# Patient Record
Sex: MALE | Race: WHITE | Employment: STUDENT | URBAN - METROPOLITAN AREA
[De-identification: names, ages, dates, MRNs, and addresses within clinical notes are randomized per-mention and may not be internally consistent; named-entity substitution may affect disease eponyms.]

---

## 2017-01-08 ENCOUNTER — HOSPITAL ENCOUNTER (OUTPATIENT)
Age: 22
Discharge: ANOTHER HOSPITAL | End: 2017-01-08
Attending: FAMILY MEDICINE
Payer: COMMERCIAL

## 2017-01-08 ENCOUNTER — HOSPITAL ENCOUNTER (EMERGENCY)
Facility: HOSPITAL | Age: 22
Discharge: STILL ADMITTED OR ADMINISTRATIVE | End: 2017-01-09
Payer: COMMERCIAL

## 2017-01-08 ENCOUNTER — HOSPITAL (OUTPATIENT)
Dept: OTHER | Age: 22
End: 2017-01-08
Attending: SURGERY

## 2017-01-08 VITALS
TEMPERATURE: 98 F | OXYGEN SATURATION: 100 % | DIASTOLIC BLOOD PRESSURE: 84 MMHG | WEIGHT: 235 LBS | RESPIRATION RATE: 16 BRPM | SYSTOLIC BLOOD PRESSURE: 119 MMHG | HEART RATE: 85 BPM

## 2017-01-08 DIAGNOSIS — R10.31 ACUTE RIGHT LOWER QUADRANT PAIN: Primary | ICD-10-CM

## 2017-01-08 LAB
ALBUMIN SERPL-MCNC: 4.4 GM/DL (ref 3.6–5.1)
ALP SERPL-CCNC: 78 UNIT/L (ref 45–117)
ALT SERPL-CCNC: 28 UNIT/L
AMORPH SED URNS QL MICRO: NORMAL
ANALYZER ANC (IANC): NORMAL
ANION GAP SERPL CALC-SCNC: 10 MMOL/L (ref 10–20)
APPEARANCE UR: CLEAR
AST SERPL-CCNC: 19 UNIT/L
BACTERIA #/AREA URNS HPF: NORMAL /HPF
BASOPHILS # BLD: 0 THOUSAND/MCL (ref 0–0.3)
BASOPHILS NFR BLD: 0 %
BILIRUB CONJ SERPL-MCNC: 0.1 MG/DL (ref 0–0.2)
BILIRUB SERPL-MCNC: 0.6 MG/DL (ref 0.2–1)
BILIRUB UR QL: NEGATIVE
BUN SERPL-MCNC: 12 MG/DL (ref 10–20)
BUN/CREAT SERPL: 12 (ref 7–25)
CALCIUM SERPL-MCNC: 9.4 MG/DL (ref 8.4–10.2)
CAOX CRY URNS QL MICRO: NORMAL
CHLORIDE: 105 MMOL/L (ref 98–107)
CO2 SERPL-SCNC: 31 MMOL/L (ref 21–32)
COLOR UR: YELLOW
CREAT SERPL-MCNC: 0.98 MG/DL (ref 0.67–1.17)
DIFFERENTIAL METHOD BLD: NORMAL
EOSINOPHIL # BLD: 0.2 THOUSAND/MCL (ref 0.1–0.5)
EOSINOPHIL NFR BLD: 3 %
EPITH CASTS #/AREA URNS LPF: NORMAL /[LPF]
ERYTHROCYTE [DISTWIDTH] IN BLOOD: 12 % (ref 11–15)
FATTY CASTS #/AREA URNS LPF: NORMAL /[LPF]
GLUCOSE SERPL-MCNC: 87 MG/DL (ref 65–99)
GLUCOSE UR-MCNC: NEGATIVE MG/DL
GRAN CASTS #/AREA URNS LPF: NORMAL /[LPF]
HEMATOCRIT: 43.4 % (ref 39–51)
HGB BLD-MCNC: 15.1 GM/DL (ref 13–17)
HGB UR QL: NEGATIVE
HYALINE CASTS #/AREA URNS LPF: NORMAL /LPF (ref 0–5)
KETONES UR-MCNC: NEGATIVE MG/DL
LEUKOCYTE ESTERASE UR QL STRIP: NEGATIVE
LIPASE SERPL-CCNC: 98 UNIT/L (ref 73–393)
LYMPHOCYTES # BLD: 1.8 THOUSAND/MCL (ref 1.2–5.2)
LYMPHOCYTES NFR BLD: 21 %
MCH RBC QN AUTO: 30.7 PG (ref 26–34)
MCHC RBC AUTO-ENTMCNC: 34.8 GM/DL (ref 32–36.5)
MCV RBC AUTO: 88.2 FL (ref 78–100)
MIXED CELL CASTS #/AREA URNS LPF: NORMAL /[LPF]
MONOCYTES # BLD: 0.6 THOUSAND/MCL (ref 0.3–0.9)
MONOCYTES NFR BLD: 6 %
MUCOUS THREADS URNS QL MICRO: PRESENT
NEUTROPHILS # BLD: 6.1 THOUSAND/MCL (ref 1.8–8)
NEUTROPHILS NFR BLD: 70 %
NEUTS SEG NFR BLD: NORMAL %
NITRITE UR QL: NEGATIVE
PERCENT NRBC: NORMAL
PH UR: 6 UNIT (ref 5–7)
PLATELET # BLD: 228 THOUSAND/MCL (ref 140–450)
POCT BILIRUBIN URINE: NEGATIVE
POCT BLOOD URINE: NEGATIVE
POCT GLUCOSE URINE: NEGATIVE MG/DL
POCT KETONE URINE: NEGATIVE MG/DL
POCT LEUKOCYTE ESTERASE URINE: NEGATIVE
POCT NITRITE URINE: NEGATIVE
POCT PH URINE: 6.5 (ref 5–8)
POCT PROTEIN URINE: NEGATIVE MG/DL
POCT SPECIFIC GRAVITY URINE: 1.02
POCT URINE CLARITY: CLEAR
POCT URINE COLOR: YELLOW
POCT UROBILINOGEN URINE: 0.2 MG/DL
POTASSIUM SERPL-SCNC: 4.2 MMOL/L (ref 3.4–5.1)
PROT SERPL-MCNC: 7.8 GM/DL (ref 6.4–8.2)
PROT UR QL: NEGATIVE MG/DL
RBC # BLD: 4.92 MILLION/MCL (ref 4.5–5.9)
RBC #/AREA URNS HPF: NORMAL /HPF (ref 0–3)
RBC CASTS #/AREA URNS LPF: NORMAL /[LPF]
RENAL EPI CELLS #/AREA URNS HPF: NORMAL /[HPF]
SODIUM SERPL-SCNC: 142 MMOL/L (ref 135–145)
SP GR UR: 1.02 (ref 1–1.03)
SPECIMEN SOURCE: NORMAL
SPERM URNS QL MICRO: NORMAL
SQUAMOUS #/AREA URNS HPF: NORMAL /HPF (ref 0–5)
T VAGINALIS URNS QL MICRO: NORMAL
TRI-PHOS CRY URNS QL MICRO: NORMAL
URATE CRY URNS QL MICRO: NORMAL
URINE REFLEX: NORMAL
URNS CMNT MICRO: NORMAL
UROBILINOGEN UR QL: 0.2 MG/DL (ref 0–1)
WAXY CASTS #/AREA URNS LPF: NORMAL /[LPF]
WBC # BLD: 8.6 THOUSAND/MCL (ref 4.2–11)
WBC #/AREA URNS HPF: NORMAL /HPF (ref 0–5)
WBC CASTS #/AREA URNS LPF: NORMAL /[LPF]
YEAST HYPHAE URNS QL MICRO: NORMAL
YEAST URNS QL MICRO: NORMAL

## 2017-01-08 PROCEDURE — 81002 URINALYSIS NONAUTO W/O SCOPE: CPT

## 2017-01-08 PROCEDURE — 81002 URINALYSIS NONAUTO W/O SCOPE: CPT | Performed by: FAMILY MEDICINE

## 2017-01-08 PROCEDURE — 99205 OFFICE O/P NEW HI 60 MIN: CPT

## 2017-01-08 NOTE — ED INITIAL ASSESSMENT (HPI)
Patient states he woke up at 5am and had a sharp pain to the right lower abdomen. Patient states it feels like a knife was in his side.

## 2017-01-08 NOTE — ED PROVIDER NOTES
Patient Seen in: [facility] END    History   Patient presents with:  Abdominal Pain    Stated Complaint: RT SIDE PAIN    HPI  23 yo M here with acute onset R sided lower abdominal pain  No prior such pain and no prior surgeries   No Labs Reviewed   POCT URINALYSIS DIPSTICK - Normal       Orders Placed This Encounter  POCT urinalysis dipstick Once    Concern for acute appendicitis discussed   Plan of care as below. He has his uncle here who will drive him to the ER.  Stable vitals a

## 2019-02-26 ENCOUNTER — TELEPHONE (OUTPATIENT)
Dept: PSYCHIATRY | Facility: CLINIC | Age: 24
End: 2019-02-26

## 2019-02-26 NOTE — TELEPHONE ENCOUNTER
Behavorial Health Outpatient Intake Questions    Referred by: Raegan Mendiola with provider before scheduling    Are there any developmental disabilities? No    Does the patient have hearing impairment? No    Does the patient have ICM or CTT? No    Taking injectable psychiatric medications? NoIf yes, patient can not be seen here  Has the patient ever seen or currently see a psychiatrist? No If yes who/when? Has the patient ever seen or currently see a therapist? No If yes who/when? How many visits did the pt have for previous psychiatric treatment?  History    Has the patient served in the [place] [address]? No    If yes, have you had combat services? No    Was the patient activated into federal active duty as a member of the national guard or reserve? No    Minor Child    Who has custody of the child? Is there a custody agreement? If there is a custody agreement remind parent that they must bring a copy to the first appt or they will not be seen  Behavorial Health Outpatient Intake History     Presenting Problem (in patient's words)  ADHD,ADD    Substance Abuse:No concerns of substance abuse are reported  Has the patient been seen here previously, either inpatient or outpatient? No outpatient    If seen as outpatient, what provider(s) did the patient see? A member of the patient's family has been in therapy here with NO    ACCEPTED as a patient Yes Appointment Date: 3/4/19 @ 11: 00am   Shanel Vo    Referred Elsewhere?  No    Primary Care Physician: Nidia Orozco DO    PCP telephone number: [phone]    SUB: Sheryl Meyers JR ,father    : [birth year]  EMPLOYER: [employer]ra Boyle   INS: Bobby Goodwin  ID: [id number]    GRP: [id number]   (coverage starts )  TEL: [phone]

## 2019-03-04 ENCOUNTER — OFFICE VISIT (OUTPATIENT)
Dept: PSYCHIATRY | Facility: CLINIC | Age: 24
End: 2019-03-04
Payer: COMMERCIAL

## 2019-03-04 VITALS
WEIGHT: 253.1 LBS | BODY MASS INDEX: 33.54 KG/M2 | HEIGHT: 73 IN | DIASTOLIC BLOOD PRESSURE: 78 MMHG | HEART RATE: 83 BPM | SYSTOLIC BLOOD PRESSURE: 118 MMHG

## 2019-03-04 DIAGNOSIS — F41.1 GENERALIZED ANXIETY DISORDER: ICD-10-CM

## 2019-03-04 DIAGNOSIS — F39 MOOD DISORDER (HCC): Primary | ICD-10-CM

## 2019-03-04 DIAGNOSIS — F19.10 POLYSUBSTANCE ABUSE (HCC): ICD-10-CM

## 2019-03-04 PROCEDURE — 99205 OFFICE O/P NEW HI 60 MIN: CPT | Performed by: PHYSICIAN ASSISTANT

## 2019-03-04 RX ORDER — VENLAFAXINE HYDROCHLORIDE 37.5 MG/1
37.5 CAPSULE, EXTENDED RELEASE ORAL DAILY
Qty: 30 CAPSULE | Refills: 0 | Status: SHIPPED | OUTPATIENT
Start: 2019-03-04 | End: 2019-04-08 | Stop reason: DRUGHIGH

## 2019-03-04 NOTE — PSYCH
TREATMENT PLAN (Medication Management Only)        [address] [place] [address]    Name and Date of Birth: Matthew Bower Scoggin 26 y o  1995  Date of Treatment Plan: March 4, 2019  Diagnosis/Diagnoses:    1  Mood disorder [facility])      Strengths/Personal Resources for Self-Care: Theater, outgoing, friendly, fun  Area/Areas of need (in own words): Attention span, focus, mood fluctuations  1  Long Term Goal: improve concentration  Target Date: 1 year - 3/4/2020  Person/Persons responsible for completion of goal: Jasmyn Morrow and Shanel Vo PA-C  2  Short Term Objective (s) - How will we reach this goal?:   A  Provider new recommended medication/dosage changes and/or continue medication(s): Start Effexor-XR 37 5 mg   B   N/A   C   N/A  Target Date: 1 month - 4/4/2019  Person/Persons Responsible for Completion of Goal: Jasmyn Morrow and Shanel Vo PA-C  Progress Towards Goals: starting treatment  Treatment Modality: medication management every 1 months  Review due 90 to 120 days from date of this plan: 3 months - 6/4/2019  Expected length of service: ongoing treatment    My Physician/PA/NP and I have developed this plan together and I agree to work on the goals and objectives  I understand the treatment goals that were developed for my treatment      Signature:       Date and time:    Signature of parent/guardian if under age of 15 years: Date and time:    Signature of provider:      Date and time:    Signature of Supervising Physician:    Date and time: 3/4/2019      Shanel Vo PA-C
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
## 2019-04-08 ENCOUNTER — OFFICE VISIT (OUTPATIENT)
Dept: PSYCHIATRY | Facility: CLINIC | Age: 24
End: 2019-04-08
Payer: COMMERCIAL

## 2019-04-08 DIAGNOSIS — F39 MOOD DISORDER (HCC): Primary | ICD-10-CM

## 2019-04-08 DIAGNOSIS — F41.1 GENERALIZED ANXIETY DISORDER: ICD-10-CM

## 2019-04-08 DIAGNOSIS — F19.10 POLYSUBSTANCE ABUSE (HCC): ICD-10-CM

## 2019-04-08 PROCEDURE — 99214 OFFICE O/P EST MOD 30 MIN: CPT | Performed by: PHYSICIAN ASSISTANT

## 2019-04-08 PROCEDURE — 96127 BRIEF EMOTIONAL/BEHAV ASSMT: CPT | Performed by: PHYSICIAN ASSISTANT

## 2019-04-08 RX ORDER — VENLAFAXINE HYDROCHLORIDE 75 MG/1
75 CAPSULE, EXTENDED RELEASE ORAL DAILY
Qty: 30 CAPSULE | Refills: 0 | Status: SHIPPED | OUTPATIENT
Start: 2019-04-08 | End: 2019-05-07 | Stop reason: SDUPTHER

## 2019-05-07 DIAGNOSIS — F41.1 GENERALIZED ANXIETY DISORDER: ICD-10-CM

## 2019-05-07 DIAGNOSIS — F39 MOOD DISORDER (HCC): ICD-10-CM

## 2019-05-07 RX ORDER — VENLAFAXINE HYDROCHLORIDE 75 MG/1
CAPSULE, EXTENDED RELEASE ORAL
Qty: 30 CAPSULE | Refills: 0 | Status: SHIPPED | OUTPATIENT
Start: 2019-05-07 | End: 2019-05-17 | Stop reason: SDUPTHER

## 2019-05-17 ENCOUNTER — OFFICE VISIT (OUTPATIENT)
Dept: PSYCHIATRY | Facility: CLINIC | Age: 24
End: 2019-05-17
Payer: COMMERCIAL

## 2019-05-17 VITALS
HEART RATE: 82 BPM | BODY MASS INDEX: 33.08 KG/M2 | SYSTOLIC BLOOD PRESSURE: 127 MMHG | WEIGHT: 250.7 LBS | DIASTOLIC BLOOD PRESSURE: 82 MMHG

## 2019-05-17 DIAGNOSIS — F19.10 POLYSUBSTANCE ABUSE (HCC): ICD-10-CM

## 2019-05-17 DIAGNOSIS — F41.1 GENERALIZED ANXIETY DISORDER: ICD-10-CM

## 2019-05-17 DIAGNOSIS — F31.62 BIPOLAR DISORDER, CURRENT EPISODE MIXED, MODERATE (HCC): Primary | ICD-10-CM

## 2019-05-17 PROCEDURE — 99214 OFFICE O/P EST MOD 30 MIN: CPT | Performed by: PHYSICIAN ASSISTANT

## 2019-05-17 RX ORDER — VENLAFAXINE HYDROCHLORIDE 75 MG/1
75 CAPSULE, EXTENDED RELEASE ORAL DAILY
Qty: 30 CAPSULE | Refills: 0 | Status: SHIPPED | OUTPATIENT
Start: 2019-05-17

## 2019-05-23 ENCOUNTER — TELEPHONE (OUTPATIENT)
Dept: PSYCHIATRY | Facility: CLINIC | Age: 24
End: 2019-05-23

## 2019-05-23 ENCOUNTER — DOCUMENTATION (OUTPATIENT)
Dept: PSYCHIATRY | Facility: CLINIC | Age: 24
End: 2019-05-23

## 2019-05-24 ENCOUNTER — TELEPHONE (OUTPATIENT)
Dept: PSYCHIATRY | Facility: CLINIC | Age: 24
End: 2019-05-24

## 2019-07-07 NOTE — PSYCH
Psychiatric Medication Management - [address] [facility] [place] 26 y o  male MRN: [id number]    Reason for Visit:   Chief Complaint   Patient presents with    Manic Behavior    Anxiety    Depression    Mood Swings         Subjective:  23-5 year-old  male, domiciled with parents and siblings in [place], currently working as a  and as a part-time , denies [address], denies past psychiatric hospitalizations, denies past suicide attempts, no h/o self-injurious behaviors, no h/o physical aggression, admits to extensive history of being bullied throughout elementary school, middle school and high school, denies significant PMH, substance abuse history significant for a history of extensive polysubstance use, presents to [facility] outpatient clinic on referral for "possible ADHD," with patient reporting "I think I have ADHD "      Treatment Plan From Previous Visit:  1) Bipolar Disorder  · Start Seroquel XR 50 mg once daily by mouth at bedtime  · Discussed that this dose may need to be further titrated to reach maximum therapeutic benefit  § Discussed that the prescription would not be sent to the pharmacy until the patient completes metabolic labs to evaluate for any underlying condition that may be exacerbated by a 2nd generation antipsychotic medication    § Once lab result are received and evaluated to be within normal limits, provider will send the prescription to the patient's indicated pharmacy  § Discussed that the patient may feel drowsy during the 1st few days of initiating medication, however his body should adjust to this side effect  § Instructed patient to call provider if there are any negative side effects or worsening of symptoms after starting this medication  § May need to consider alternative medication of Abilify if patient is unable to tolerate treatment with Seroquel  § Continue to monitor for elevated affect, depressed mood, racing thoughts, mood instability, affective lability, suicidal ideation and self-injurious behaviors  § PHQ-A: Previous score on 4/8/2019: 18  2) Anxiety  § Continue Effexor XR 75 mg once daily by mouth  § Continue to monitor for increased anxiety, panic attacks and irritability  § Would encourage regularly scheduled outpatient individual psychotherapy  § LATOYA-7: Previous score on 4/8/2019: 13  3) Polysubstance Use  · Continue to encourage patient to discontinue polysubstance use  · Will not be prescribing patient a stimulant medication due to significant history of polysubstance use, including history of recreational stimulant use  4) Medical  · Continue to follow-up with Primary Care Provider for  ongoing medical care  · Evaluate metabolic labs prior to initiating treatment with second-generation antipsychotic medication  5) Follow-up with this provider in one month    Of Note: Patient repeatedly called Nursing following his office visit  He repeatedly demanded to speak with Provider to obtain a prescription for a stimulant for his "ADHD " Nursing explained to patient that he was instructed to complete the labs that were ordered at his most recent office visit  Patient refused to have his lab work completed, and as such, he has not started treatment with Seroquel  He expressed to Nursing that he is coming into the office with the primary goal of obtaining a stimulant prescription for his "ADHD "       On Problem-Focused Interview:   1) Bipolar - Patient reports that he has been doing well  Patient reports he stopped taking Effexor around one month ago  He reports that he feels emotionally fine, but he is still concerned with his focus  He reports that he is able to wake up in the morning because he is working consistently at a summer [employer] in [place]  He reports that his energy wanes throughout the day  He reports that his mood has been good overall   He reports that his mood has improved and he reports that any feelings of sadness or depression has improved  He denies having any thoughts of wanting to harm himself  He denies any active or passive suicidal ideation, intent or plan  He contracts for safety  Patient reports that he is having difficulty with "staying on task " He has trouble with maintaining energy throughout the day  He has a hard time keeping up with his full schedule  He states that he is exhausted all the time  He states that he feels that his mood is stable, but now he is more aware of his inability to "stay on task " He states that he is "more in tune with my difficulty staying on task " He thinks his mood has improved because of his ability to keep himself busy  He states that he has a hard time remembering the information he needs to keep track of  He has meetings all day and he has been forgetful often  He reports that he has been experiencing difficulty focusing throughout his entire life  He states that his family neglected to have him evaluated throughout his whole life  He states that it has always been difficult to pay attention  Patient states that he is unwilling to try any medication other than a stimulant  He states that "every other medication hasn't worked, so I'm only willing to take a stimulant," despite patient only ever taking Effexor  2) Anxiety - He denies any significant anxiety  He denies any panic attacks  Patient denies any symptoms and continues to re-direct the conversation to requesting a stimulant medication  3) Polysubstance Use - He reports that he goes to the bar every couple of days and drinks  He denies recent marijuana use  He states that he last smoked marijuana one month ago because 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           Psychiatric Review of Systems:   Sleep normal   Appetite normal   Decreased Energy Yes    Decreased Interest/Pleasure in Activities No   Medication Side Effects No   Mood Symptoms No   Anxiety/Panic Symptoms No Attention/Concentration Symptoms Yes    Manic Symptoms No   Auditory or Visual Hallucinations No   Delusional Ideations No   Suicidal/Homicidal Ideation No       Review Of Systems:  Constitutional Feeling Tired   ENT Negative   Cardiovascular Negative   Respiratory Negative   Gastrointestinal Negative   Genitourinary Negative   Musculoskeletal Negative   Integumentary Negative   Neurological Negative   Endocrine Negative         Past Medical History:   Patient Active Problem List   Diagnosis    Bipolar disorder, current episode mixed, moderate (HCC)    Generalized anxiety disorder    Polysubstance abuse ([facility] [address] )              Allergies:   No Known Allergies      Past Surgical History:   No past surgical history on file        Past Psychiatric History:   General Information: Denies previous suicide attempt, denies self-injurious behavior, denies psychiatric hospitalizations, denies history of aggressive behavior     Past Medication Trials: None     Current Psychiatric Medications: Effexor XR 75 mg (self-discontinued one month ago)     Therapist/Counseling Services: None, has never seen a therapist        Family Psychiatric History:   Sister - age 21 - polysubstance use, anxiety, depression, multiple suicide attempts, self-harm (has taken Prozac)  Mom - anxiety, depression  Cousin - schizophrenia     No FH of suicide      Social History:   Polysubstance use, lives at home with mom and dad and siblings: younger sister (21 - polysubstance use, psychiatric history) and two younger brothers (13 and 25), 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      Substance Abuse History:   History of Polysubstance Use:  Has tried friend's Adderall and Focalin in college to stay up and study, and states that it helped him focus       Marijuana - last use was last night, uses sporadically, reports prior heavy use     Admits to using cocaine, Evita, Acid, Mushrooms, Xanax several times - used these drugs to experiment and because he was "bored"     Admits to a "huge" drinking problem in college - used to drink every day, one black out episode per week, admits to drinking "a bottle of Stewart Lowell in minutes  "      Currently - one to two drinks on the weekends, 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, smoked marijuana last night, but had not smoked for months prior to that  Denies any recent marijuana use      Admits to using Mushrooms a couple of days ago because he was "bored "         Traumatic History:  Denies physical or verbal abuse  Admits to being bullied throughout elementary school, middle school and high school          The following portions of the patient's history were reviewed and updated as appropriate: allergies, current medications, past family history, past medical history, past social history, past surgical history and problem list         Objective:  Vitals:    07/12/19 1418   BP: 137/83   Pulse: 85     Height: 6' 0 5" (184 2 cm)   Weight (last 2 days)     Date/Time   Weight    07/12/19 1418   118 (260 4)                    Mental Status:  Appearance sitting comfortably in chair, dressed in casual clothing, adequate hygiene and grooming, cooperative with interview, fair eye contact   Mood "Good"   Affect Appears mildly elevated, labile   Speech Pressured at times   Thought Processes Perseverative; fixated on obtaining stimulant medications   Associations intact associations   Hallucinations Denies any auditory or visual hallucinations   Thought Content No passive or active suicidal or homicidal ideation, intent, or plan , No overt delusions elicited, ruminative about stressors, help-seeking and Future-oriented   Orientation Oriented to person, place, time, and situation   Recent and Remote Memory grossly intact   Attention Span inattentive at times   Intellect Appears to be of Average Intelligence   Insight Limited insight   Judgment judgment was limited   Muscle Strength Muscle strength and tone were normal   Language Within normal limits   Fund of Knowledge Age appropriate   Pain none         Assessment:       Diagnoses and all orders for this visit:    Bipolar disorder, current episode mixed, moderate (HCC)    Generalized anxiety disorder    Polysubstance abuse ([address] )    Other orders  -     loratadine (CLARITIN) 10 mg tablet; Take 10 mg by mouth daily  -     montelukast (SINGULAIR) 10 mg tablet; Take 10 mg by mouth daily  -     omeprazole (PriLOSEC) 40 MG capsule; Take 40 mg by mouth daily                Diagnosis:  1) Mood Disorder, rule out Bipolar II Disorder  2) Generalized Anxiety Disorder  3) Polysubstance use          Treatment Recommendations: On assessment today, patient presents with fixation on obtaining a prescription for stimulant medication at this time  He states that "nothing else" that he has taken has worked for him  Of note, he has only taken Effexor  Patient refused to take Seroquel prescribed at last office visit and refused to complete the metabolic lab work that was ordered  Patient complains of fatigue during the day, however it is unclear if there is any underlying metabolic abnormality due to the patient not having his labs completed  The patient diminishes any mood or anxiety symptoms and remains fixated and perseverative on obtaining a stimulant, which he specifically requests  He states that he has "difficulty staying on task " He states that he has symptoms, and when he describes them, they are phrased similarly to diagnostic criteria  The patient is hyper-verbal and speaks rapidly  Since his initial intake appointment, the patient has requested stimulant medications  Of note, the patient has a longstanding history of polysubstance use, specifically, he has recreationally purchased Adderall and Focalin on multiple occasions  He was not diagnosed with ADHD as a child   It is possible that the patient does have underlying ADHD, however to fully and comprehensively evaluate for this, Provider recommended that patient first have Neuropsychiatric testing before stimulant medications would be considered  Provider also explained that in adults without prior diagnosis of ADHD in childhood, insurance approval of stimulants is very difficult to obtain  Patient inquired how much the medication would cost if he paid out of pocket  The patient refused to complete Neuropsychiatric testing at this time, but stated he may contact provider at a later date for the referral  Provider suggested non-stimulant options such as Strattera or Wellbutrin, however the patient refused either of these options when he asked what type of medication they are  Provider re-iterated that they are non-stimulant options, and patient stated, "no, I need a stimulant  Nothing else has worked for me " Patient has only tried Effexor XR up to a dose of 75 mg  Patient was relatively calm and cooperative when receiving this information, although he was frustrated with the knowledge that he would not be prescribed a stimulant medication  Patient stated he may follow up in the future if he decides to pursue Neuropsychiatric testing  On suicide risk assessment, Patient denies any thoughts of wanting to harm self or others  Patient denies any recent self-injurious behaviors  Patient denies any active or passive suicidal ideation, intent or plan  Patient is able to contract for safety at the present time  Patient denied all symptoms during today's office visit, however it is unclear whether his denial of symptoms is related to his fixation on obtaining a prescription for a stimulant at today's office visit  Patient is future thinking and goal oriented  He is motivated  He has a supportive family  He has a significant history of polysubstance use   Despite any risk factors that may be present, patient is not an imminent risk of harm to self or others, and is deemed appropriate for continuing outpatient level of care at this time  PHQ-A: Previous score on 4/8/2019: 18  LATOYA-7: Previous score on 4/8/2019: 13        Plan:  1) Bipolar Disorder/Anxiety   Patient self-discontinued Effexor XR 75 mg one month ago due to fixation on being prescribed a stimulant medication   Patient refused to complete the metabolic labs that were ordered at last office visit   Patient refused to start Seroquel, which was prescribed at last office visit   Patient sought treatment with stimulant medication today to treat "ADHD"  o Patient does not currently meet criteria for a diagnosis of ADHD at this time  o Recommended referral for Neuropsychiatric testing to evaluate for ADHD, however patient declines at this time  o Patient remained fixated on obtaining a prescription for a stimulant  o Patient has a longstanding history of polysubstance use, including multiple occasions of recreational stimulant use  o Recommended several non-stimulant options for patient to assist with focus such as Strattera and Wellbutrin, however patient declines at this time   Will evaluate for any worsening mood symptoms, any presence of suicidal thoughts or behaviors, worsening anxiety, increased irritability or increased mood lability  · PHQ-A: Previous score on 4/8/2019: 18  · LATOYA-7: Previous score on 4/8/2019: 13  2) Polysubstance Use  · Based on patient's longstanding history of polysubstance use, including recreational stimulant use, patient will not be receiving a prescription for stimulant medications during today's office visit  · Continue motivational interviewing to discourage polysubstance use  3) Medical   Continue to follow-up with Primary Care Provider for ongoing medical care     Patient reports persistent fatigue   Previously ordered baseline metabolic labs to evaluate for any underlying metabolic abnormality, however patient refused to have these labs completed  4) Follow-up with this Provider was not established due to patient's displeasure with recommended treatment plan  · Patient will contact Provider if he chooses to obtain referral for Neuropsychiatric testing            Risks, Benefits And Possible Side Effects Of Medications:  Risks, benefits, and possible side effects of medications explained to patient and family, they verbalize understanding    Controlled Medication Discussion: N/A           Based on today's assessment and clinical criteria, patient contracts for safety and is not an imminent risk of harm to self or others  Outpatient level of care is deemed appropriate at this current time  Patient understands and agrees that if they can no longer contract for safety, they need to call the office or report to their nearest Emergency Room for immediate evaluation  Portions of this progress note were dictated with the use of transcription software           Shanel Vo PA-C

## 2019-07-12 ENCOUNTER — OFFICE VISIT (OUTPATIENT)
Dept: PSYCHIATRY | Facility: CLINIC | Age: 24
End: 2019-07-12
Payer: COMMERCIAL

## 2019-07-12 VITALS
DIASTOLIC BLOOD PRESSURE: 83 MMHG | HEIGHT: 73 IN | WEIGHT: 260.4 LBS | SYSTOLIC BLOOD PRESSURE: 137 MMHG | HEART RATE: 85 BPM | BODY MASS INDEX: 34.51 KG/M2

## 2019-07-12 DIAGNOSIS — F31.62 BIPOLAR DISORDER, CURRENT EPISODE MIXED, MODERATE (HCC): Primary | ICD-10-CM

## 2019-07-12 DIAGNOSIS — F19.10 POLYSUBSTANCE ABUSE (HCC): ICD-10-CM

## 2019-07-12 DIAGNOSIS — F41.1 GENERALIZED ANXIETY DISORDER: ICD-10-CM

## 2019-07-12 PROCEDURE — 99214 OFFICE O/P EST MOD 30 MIN: CPT | Performed by: PHYSICIAN ASSISTANT

## 2019-07-12 RX ORDER — OMEPRAZOLE 40 MG/1
40 CAPSULE, DELAYED RELEASE ORAL DAILY
Refills: 2 | COMMUNITY
Start: 2019-05-13

## 2019-07-12 RX ORDER — LORATADINE 10 MG/1
10 TABLET ORAL DAILY
Refills: 2 | COMMUNITY
Start: 2019-06-10

## 2019-07-12 RX ORDER — MONTELUKAST SODIUM 10 MG/1
10 TABLET ORAL DAILY
Refills: 2 | COMMUNITY
Start: 2019-06-10

## 2019-07-12 NOTE — BH TREATMENT PLAN
TREATMENT PLAN (Medication Management Only)      [facility]    Name and Date of Birth: Dessa Brittle Scoggin 26 y o  1995  Date of Treatment Plan: July 12, 2019  Diagnosis/Diagnoses:    1  Bipolar disorder, current episode mixed, moderate ([address] )    2  Generalized anxiety disorder    3  Polysubstance abuse [facility])      Strengths/Personal Resources for Self-Care: Determined  Area/Areas of need (in own words): Focus  1  Long Term Goal: improve control of mood stability  Target Date: 1 year - 7/12/2020  Person/Persons responsible for completion of goal: Jelena Allen PA-C  2  Short Term Objective (s) - How will we reach this goal?:   A  Provider new recommended medication/dosage changes and/or continue medication(s): Provider and patient are unable to agree on medication management at this time  B   Patient will need to be referred for Neuropsychiatric testing, however he declines at this time  C   N/A  Target Date: 1 month - 8/12/2019  Person/Persons Responsible for Completion of Goal: MIGUEL and Shanel Vo PA-C  Progress Towards Goals: continuing treatment  Treatment Modality: medication management every 2 months  Review due 90 to 120 days from date of this plan: 3 months - 10/12/2019  Expected length of service: ongoing treatment    My Physician/PA/NP and I have developed this plan together and I agree to work on the goals and objectives  I understand the treatment goals that were developed for my treatment        Signature:        Date and time:        Signature of parent/guardian if under age of 15 years:  Date and time:        Signature of provider:       Date and time: 7/12/2019    Shanel Vo PA-C        Signature of Supervising Physician:     Date and time:

## 2020-06-04 ENCOUNTER — DOCUMENTATION (OUTPATIENT)
Dept: PSYCHIATRY | Facility: CLINIC | Age: 25
End: 2020-06-04

## 2024-09-13 ENCOUNTER — HOSPITAL ENCOUNTER (OUTPATIENT)
Dept: HOSPITAL 99 - HWRAD | Age: 29
End: 2024-09-13
Payer: COMMERCIAL

## 2024-09-13 DIAGNOSIS — R74.8: Primary | ICD-10-CM

## (undated) NOTE — ED AVS SNAPSHOT
[facility] in [address] [place] [address] Drive,[address]    [address] [place] [address]    Phone:  [phone]    Fax:  [phone]           Alisia Su   MRN: [id number]    Department:  THE [facility] in [facility] [place]   Date of Visit:  1/8/2017 Expect to receive an electronic request (by e-mail or text) to complete a self-assessment the day after your visit. You may also receive a call from our patient liason soon after your visit.  Also, some patients receive a detailed feedback survey mailed to Manish Gordon [address] [place] [phone] [address] [facility] [id number] ([address]) [facility] [address] [facility].  (Cat Your unique [employer] Access Code: [email or web address]  Expires: 3/9/2017 11:42 AM    If you have questions, you can call [phone] to talk to our [facility] Staff. Remember, [employer] is NOT to be used for urgent needs. For medical emergencies, dial 911.